# Patient Record
Sex: FEMALE | Race: WHITE | NOT HISPANIC OR LATINO | Employment: FULL TIME | URBAN - METROPOLITAN AREA
[De-identification: names, ages, dates, MRNs, and addresses within clinical notes are randomized per-mention and may not be internally consistent; named-entity substitution may affect disease eponyms.]

---

## 2023-04-04 ENCOUNTER — TELEPHONE (OUTPATIENT)
Dept: OPTOMETRY | Facility: CLINIC | Age: 63
End: 2023-04-04
Payer: COMMERCIAL

## 2023-04-04 ENCOUNTER — TELEPHONE (OUTPATIENT)
Dept: OPHTHALMOLOGY | Facility: CLINIC | Age: 63
End: 2023-04-04
Payer: COMMERCIAL

## 2023-04-04 NOTE — TELEPHONE ENCOUNTER
----- Message from Cristal Horvath MA sent at 4/4/2023  4:34 PM CDT -----  Regarding: Appt  Contact: Self  Can someone book an appt for this pt? For the last 3 weeks, her lower eyelid has been red, and itchy. She has a mole that is being irritated from her rubbing and is looking to have it removed.    ----- Message -----  From: Tenisha Glass  Sent: 4/3/2023   3:38 PM CDT  To: Cristal Horvath MA      ----- Message -----  From: Maureen Austin  Sent: 4/3/2023   3:20 PM CDT  To: [facility]ODRIGUE[facility] Staff    Type:  Appointment Request    Caller is requesting a sooner appointment.  Caller declined first available appointment listed below.  Caller will not accept being placed on the waitlist and is requesting a message be sent to doctor.  Name of Caller:Patient  When is the first available appointment?N/A    Symptoms:  trouble with right eye  / itching and burning / mole inner eyelid    Would the patient rather a call back or a response via [facility]? Call back  Best Call Back Number:[phone]    Additional Information: States she is a new patient - has researched and strongly prefers to see Dr. Reeves if at all possible - please advise - thank you

## 2023-06-12 DIAGNOSIS — G35 MS (MULTIPLE SCLEROSIS): Primary | ICD-10-CM
